# Patient Record
Sex: FEMALE | Race: WHITE | NOT HISPANIC OR LATINO | URBAN - METROPOLITAN AREA
[De-identification: names, ages, dates, MRNs, and addresses within clinical notes are randomized per-mention and may not be internally consistent; named-entity substitution may affect disease eponyms.]

---

## 2018-08-01 ENCOUNTER — OFFICE VISIT (OUTPATIENT)
Dept: URBAN - METROPOLITAN AREA CLINIC 33 | Facility: CLINIC | Age: 83
End: 2018-08-01
Payer: MEDICARE

## 2018-08-01 DIAGNOSIS — H18.59 OTHER HEREDITARY CORNEAL DYSTROPHY: ICD-10-CM

## 2018-08-01 DIAGNOSIS — H43.813 VITREOUS DEGENERATION, BILATERAL: ICD-10-CM

## 2018-08-01 PROCEDURE — 92134 CPTRZ OPH DX IMG PST SGM RTA: CPT | Performed by: OPTOMETRIST

## 2018-08-01 PROCEDURE — 99214 OFFICE O/P EST MOD 30 MIN: CPT | Performed by: OPTOMETRIST

## 2018-08-01 ASSESSMENT — INTRAOCULAR PRESSURE
OS: 13
OD: 12

## 2018-08-01 NOTE — IMPRESSION/PLAN
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 Plan: Epithelial Basement Membrane Dystrophy (EBMD) is caused by faulty basement membrane of the corneal epithelium and in many individuals can lead to severe recurrent corneal erosions, glare, and photophobia. The patient was encouraged to use artificial tears often and will consider a hypertonic ophthalmic solution if symptoms do not resolve.

## 2018-08-01 NOTE — IMPRESSION/PLAN
Impression: Puckering of macula, bilateral: H35.373 OU. Plan: Educated patient about today's findings. Macular pucker does not appear to be visually significant at this time and may be monitored with dilated eye exam. Patient was issued an amsler grid and encouraged to call if distortion to vision occurs. Discussed the etiology of the condition with the patient and the possibility for progression of visual distortion and blur. MAC OCT ordered and reviewed today.

## 2019-11-26 ENCOUNTER — OFFICE VISIT (OUTPATIENT)
Dept: URBAN - METROPOLITAN AREA CLINIC 33 | Facility: CLINIC | Age: 84
End: 2019-11-26
Payer: MEDICARE

## 2019-11-26 DIAGNOSIS — H35.373 PUCKERING OF MACULA, BILATERAL: Primary | ICD-10-CM

## 2019-11-26 DIAGNOSIS — Z96.1 PRESENCE OF INTRAOCULAR LENS: ICD-10-CM

## 2019-11-26 DIAGNOSIS — H04.123 DRY EYE SYNDROME OF BILATERAL LACRIMAL GLANDS: ICD-10-CM

## 2019-11-26 PROCEDURE — 92134 CPTRZ OPH DX IMG PST SGM RTA: CPT | Performed by: OPTOMETRIST

## 2019-11-26 PROCEDURE — 99214 OFFICE O/P EST MOD 30 MIN: CPT | Performed by: OPTOMETRIST

## 2019-11-26 ASSESSMENT — VISUAL ACUITY
OD: 20/50
OS: 20/30

## 2019-11-26 ASSESSMENT — INTRAOCULAR PRESSURE
OD: 14
OS: 16

## 2019-11-26 NOTE — IMPRESSION/PLAN
Impression: Puckering of macula, bilateral: H35.373. Plan: Educated patient about today's findings. Macular pucker does not appear to be visually significant at this time and may be monitored with dilated eye exam. Patient was issued an amsler grid and encouraged to call if distortion to vision occurs. Discussed the etiology of the condition with the patient and the possibility for progression of visual distortion and blur.

## 2019-11-26 NOTE — IMPRESSION/PLAN
Impression: Dry eye syndrome of bilateral lacrimal glands: H04.123. Plan: Dry eyes account for the patient's complaints. There is no evidence of permanent changes to the cornea. Explained condition does not have a cure and will need artificial tears for maintenance. Discussed using gel drops at bedtime and regular ATs throughout the day.